# Patient Record
Sex: MALE | Race: WHITE
[De-identification: names, ages, dates, MRNs, and addresses within clinical notes are randomized per-mention and may not be internally consistent; named-entity substitution may affect disease eponyms.]

---

## 2018-02-12 ENCOUNTER — HOSPITAL ENCOUNTER (EMERGENCY)
Dept: HOSPITAL 33 - ED | Age: 38
Discharge: HOME | End: 2018-02-12
Payer: COMMERCIAL

## 2018-02-12 VITALS — SYSTOLIC BLOOD PRESSURE: 120 MMHG | DIASTOLIC BLOOD PRESSURE: 83 MMHG | OXYGEN SATURATION: 95 % | HEART RATE: 92 BPM

## 2018-02-12 DIAGNOSIS — R07.81: ICD-10-CM

## 2018-02-12 DIAGNOSIS — G40.909: ICD-10-CM

## 2018-02-12 DIAGNOSIS — Z72.0: ICD-10-CM

## 2018-02-12 DIAGNOSIS — I10: ICD-10-CM
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
## 2018-02-12 DIAGNOSIS — S52.201A: Primary | ICD-10-CM
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
## 2018-02-12 DIAGNOSIS — F41.9: ICD-10-CM

## 2018-02-12 DIAGNOSIS — M79.631: ICD-10-CM

## 2018-02-12 PROCEDURE — 99283 EMERGENCY DEPT VISIT LOW MDM: CPT

## 2018-02-12 PROCEDURE — 73090 X-RAY EXAM OF FOREARM: CPT

## 2018-02-12 PROCEDURE — 71100 X-RAY EXAM RIBS UNI 2 VIEWS: CPT

## 2018-02-12 PROCEDURE — 29126 APPL SHORT ARM SPLINT DYN: CPT

## 2018-02-12 PROCEDURE — 2W3CX1Z IMMOBILIZATION OF RIGHT LOWER ARM USING SPLINT: ICD-10-PCS

## 2018-02-12 RX ADMIN — IBUPROFEN ONE MG: 600 TABLET, FILM COATED ORAL at 20:17

## 2018-02-12 RX ADMIN — HYDROCODONE BITARTRATE AND ACETAMINOPHEN ONE TAB: 5; 325 TABLET ORAL at 22:19

## 2018-02-12 RX ADMIN — HYDROCODONE BITARTRATE AND ACETAMINOPHEN ONE TAB: 5; 325 TABLET ORAL at 22:20

## 2018-02-12 NOTE — ERPHSYRPT
- History of Present Illness


Time Seen by Provider: 02/12/18 20:01


Source: patient


Exam Limitations: no limitations


Physician History: 





ABOUT 14 HOURS AGO AT PT'S RESIDENCE PT SLIPPED ON HIS ICY DECK AND FELL WITH 

RESULTANT RIGHT RIB PAIN AND RIGHT FOREARM PAIN; DENIES LOC, NAUSEA, VOMITING, 

ABDOMINAL PAIN, TINGLING/NUMBNESS, WEAKNESS.


Allergies/Adverse Reactions: 








No Known Drug Allergies Allergy (Verified 02/12/18 20:13)


 





Home Medications: 








Amlodipine Besylate 10 mg [Norvasc 10 MG] 10 mg PO DAILY 02/19/16 [History]


Lamotrigine [Lamictal] 100 mg PO BID 02/19/16 [History]


Lisinopril 40 mg PO DAILY 02/19/16 [History]


Alprazolam 1 mg*** [Xanax 1 mg***] 1 mg PO BID 02/12/18 [History]





Hx Tetanus, Diphtheria Vaccination/Date Given: Yes (2014)


Hx Influenza Vaccination/Date Given: No


Hx Pneumococcal Vaccination/Date Given: No





- Review of Systems


Cardiac: Other (RIGHT RIB PAIN)


Musculoskeletal: Other (RIGHT FOREARM PAIN)


All Other Systems: Reviewed and Negative





- Past Medical History


Pertinent Past Medical History: Yes


Neurological History: Seizures


ENT History: No Pertinent History


Cardiac History: No Pertinent History, Hypertension


Respiratory History: No Pertinent History


Endocrine Medical History: No Pertinent History


Musculoskeletal History: Degenerative Disk Disease


GI Medical History: Hernia


 History: No Pertinent History


Psycho-Social History: Anxiety


Male Reproductive Disorders: No Pertinent History


Other Medical History: CHRONIC BACK PAIN





- Past Surgical History


Past Surgical History: Yes


Neuro Surgical History: No Pertinent History


Cardiac: No Pertinent History


Respiratory: No Pertinent History


Gastrointestinal: Hernia Repair


Genitourinary: No Pertinent History


Musculoskeletal: No Pertinent History


Male Surgical History: No Pertinent History





- Social History


Smoking Status: Current some day smoker


How long have you smoked: YRS


Exposure to second hand smoke: Yes


Alcohol Use: Socially


Drug Use: none


Patient Lives Alone: Yes


Significant Family History: no pertinent family hx





- Nursing Vital Signs


Nursing Vital Signs: 


 Initial Vital Signs











Temperature  97.8 F   02/12/18 20:01


 


Pulse Rate  104 H  02/12/18 20:01


 


Respiratory Rate  18   02/12/18 20:01


 


Blood Pressure  139/90   02/12/18 20:01


 


O2 Sat by Pulse Oximetry  96   02/12/18 20:01








 Pain Scale











Pain Intensity                 9

















- Melvin Coma Score


Best Eye Response (Melvin): (4) open spontaneously


Best Verbal Response (Melvin): (5) oriented


Best Motor Response ([place]): (6) obeys commands


Melvin Total: 15





- Physical Exam


General Appearance: alert


Head Injury: no evidence of injury


Eye Exam: PERRL/EOMI


ENT Exam: airway nml, nml ext.inspection


Neck Exam: trachea midline, No tenderness


Respiratory/Chest Exam: other (MILD RIGHT 9TH POSTERIOR RIB TENDERNESS WITHOUT 

CREPITUS OR BRUISING)


Cardiovascular Exam: normal heart sounds


Gastrointestinal Exam: soft, normal bowel sounds, No tenderness


Back Exam: normal range of motion, No vertebral tenderness


Extremity Exam: swelling (MILD TENDERNESS AND EDEMA OVER A ~ 3 CM DIAMETER 

ABRASION ON THE LATERAL ASPECT OF THE RIGHT FOREARM. FULL ROM OF ALL UPPER 

EXTREMITIES WITH GOOD CAPILLARY REFILL, SENSATION AND ROM OF ALL DIGITS OF BOTH 

HANDS.)


Neurologic Exam: alert, cooperative





Procedures





- Splinting


Location of Splint: Right, Forearm


Type of Splint: Orthoglass Short Arm Splint


Splint Applied By: ED Nurse


Pre-Proc Neuro Vasc Exam: normal


Post-Proc Neuro Vasc Exam: neurovascular intact, good alignment





- Course


Nursing assessment & vital signs reviewed: Yes





- Radiology Exams


  ** Right Ribs


X-ray Interpretation: Interpreted by me, No Fracture





  ** Right Forearm


X-ray Interpretation: Interpreted by me (MID-SHAFT ULNAR FRACTURE)


Ordered Tests: 


 Active Orders 24 hr











 Category Date Time Status


 


 Sling Application STAT Care  02/12/18 20:09 Active


 


 Splint STAT Care  02/12/18 21:44 Active


 


 FOREARM Stat Exams  02/12/18 20:10 Ordered


 


 RIBS UNILATERAL Stat Exams  02/12/18 20:10 Ordered








Medication Summary














Discontinued Medications














Generic Name Dose Route Start Last Admin





  Trade Name Freq  PRN Reason Stop Dose Admin


 


Hydrocodone Bitart/Acetaminophen  2 tab  02/12/18 21:43  





  Norco 5/325 Mg***  PO  02/12/18 21:44  





  STAT ONE   


 


Hydrocodone Bitart/Acetaminophen  2 tab  02/12/18 21:44  





  Norco 5/325 Mg***  PO  02/12/18 21:45  





  SENT HOME W/ PATIENT ONE   


 


Hydrocodone Bitart/Acetaminophen  Confirm  02/12/18 21:50  





  Norco 5/325 Mg***  Administered  02/12/18 21:51  





  Dose   





  2 tab   





  .ROUTE   





  .STK-MED ONE   


 


Hydrocodone Bitart/Acetaminophen  Confirm  02/12/18 21:50  





  Norco 5/325 Mg***  Administered  02/12/18 21:51  





  Dose   





  2 tab   





  .ROUTE   





  .STK-MED ONE   


 


Ibuprofen  600 mg  02/12/18 20:09  02/12/18 20:17





  Motrin 600 Mg***  PO  02/12/18 20:10  600 mg





  STAT ONE   Administration


 


Ibuprofen  Confirm  02/12/18 20:16  





  Motrin 600 Mg***  Administered  02/12/18 20:17  





  Dose   





  600 mg   





  .ROUTE   





  .STK-MED ONE   














- Progress


Discussed with : Eric ([address] - PL[address] RIGHT FOREARM IN SPLINT. PT TO GO TO 

OFFICE TOMORROW AT 1 PM.)





- Departure


Time of Disposition: 22:12


Departure Disposition: Home


Clinical Impression: 


 MID-SHAFT FRACTURE OF THE RIGHT ULNA, RIGHT MID BACK PAIN





Condition: Stable


Critical Care Time: No


Referrals: 


LOWELL BEEBE [Primary Care Provider] - 


Instructions:  Forearm Fracture (DC)


Additional Instructions: 


FOLLOW UP WITH DR KRAMER(ORTHOPEDIC SURGEON) TOMORROW AT 1 PM. CALL HIS OFFICE 

AT 9 AM TOMORROW TO ARRANGE THE 1 PM APPOINTMENT([phone]).


ELEVATE RIGHT ARM ABOVE HEART LEVEL AND KEEP SPLINT ON RIGHT FOREARM UNTIL DR KRAMER IS SEEN TOMORROW.


WEAR RIGHT ARM SLING FOR COMFORT.

## 2018-02-13 NOTE — XRAY
Indication: Pain following fall.



Comparison: July 22, 2014.



2 views of the right ribs demonstrates old 7/8/11 rib fractures.  No other

bony, articular, or soft tissue abnormalities.

## 2018-02-13 NOTE — XRAY
Indication: Pain following fall.



Comparison: None



2 views of the right forearm demonstrates mild displaced fracture involving

the mid to distal shaft of the ulna with adjacent soft tissue swelling.  No

other bony, articular, or soft tissue abnormalities.

## 2018-04-18 ENCOUNTER — HOSPITAL ENCOUNTER (OUTPATIENT)
Dept: HOSPITAL 33 - ED | Age: 38
Setting detail: OBSERVATION
LOS: 2 days | Discharge: HOME HEALTH | End: 2018-04-20
Attending: FAMILY MEDICINE | Admitting: FAMILY MEDICINE
Payer: COMMERCIAL
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
## 2018-04-18 DIAGNOSIS — F15.90: ICD-10-CM

## 2018-04-18 DIAGNOSIS — Z79.899: ICD-10-CM

## 2018-04-18 DIAGNOSIS — F41.8: ICD-10-CM

## 2018-04-18 DIAGNOSIS — S52.202S: ICD-10-CM

## 2018-04-18 DIAGNOSIS — M96.672: ICD-10-CM

## 2018-04-18 DIAGNOSIS — R56.9: Primary | ICD-10-CM

## 2018-04-18 LAB
AMPHETAMINES UR QL: POSITIVE
ANION GAP SERPL CALC-SCNC: 13.4 MEQ/L (ref 5–15)
BARBITURATES UR QL: NEGATIVE
BASOPHILS # BLD AUTO: 0.04 10*3/UL (ref 0–0.4)
BASOPHILS NFR BLD AUTO: 0.8 % (ref 0–0.4)
BENZODIAZ UR QL SCN: POSITIVE
BUN SERPL-MCNC: 12 MG/DL (ref 9–20)
CALCIUM SPEC-MCNC: 9.6 MG/DL (ref 8.4–10.2)
CHLORIDE SERPL-SCNC: 99 MMOL/L (ref 98–107)
CO2 SERPL-SCNC: 31 MMOL/L (ref 22–30)
COCAINE UR QL SCN: NEGATIVE
CREAT SERPL-MCNC: 0.86 MG/DL (ref 0.66–1.25)
EOSINOPHIL # BLD AUTO: 0.69 10*3/UL (ref 0–0.5)
GLUCOSE SERPL-MCNC: 91 MG/DL (ref 74–106)
GLUCOSE UR-MCNC: NEGATIVE MG/DL
GRANULOCYTES # BLD AUTO: 2.9 10*3/UL (ref 1.4–6.9)
HCT VFR BLD AUTO: 41.8 % (ref 42–50)
HGB BLD-MCNC: 13.6 GM/DL (ref 12.5–18)
LYMPHOCYTES # SPEC AUTO: 1.07 10*3/UL (ref 1–4.6)
MCH RBC QN AUTO: 29.5 PG (ref 26–32)
MCHC RBC AUTO-ENTMCNC: 32.5 G/DL (ref 32–36)
METHADONE UR QL: NEGATIVE
MONOCYTES # BLD AUTO: 0.49 10*3/UL (ref 0–1.3)
NEUTROPHILS NFR BLD AUTO: 55.9 % (ref 36–66)
OPIATES UR QL: NEGATIVE
PCP UR QL CFM>20 NG/ML: NEGATIVE
PLATELET # BLD AUTO: 285 K/MM3 (ref 150–450)
POTASSIUM SERPLBLD-SCNC: 4.4 MMOL/L (ref 3.5–5.1)
PROT UR STRIP-MCNC: NEGATIVE MG/DL
RBC # BLD AUTO: 4.61 M/MM3 (ref 4.1–5.6)
SODIUM SERPL-SCNC: 139 MMOL/L (ref 137–145)
THC UR QL SCN: POSITIVE
WBC # BLD AUTO: 5.2 K/MM3 (ref 4–10.5)

## 2018-04-18 PROCEDURE — 81002 URINALYSIS NONAUTO W/O SCOPE: CPT

## 2018-04-18 PROCEDURE — 95812 EEG 41-60 MINUTES: CPT

## 2018-04-18 PROCEDURE — 36415 COLL VENOUS BLD VENIPUNCTURE: CPT

## 2018-04-18 PROCEDURE — 93041 RHYTHM ECG TRACING: CPT

## 2018-04-18 PROCEDURE — 85025 COMPLETE CBC W/AUTO DIFF WBC: CPT

## 2018-04-18 PROCEDURE — 80202 ASSAY OF VANCOMYCIN: CPT

## 2018-04-18 PROCEDURE — G0378 HOSPITAL OBSERVATION PER HR: HCPCS

## 2018-04-18 PROCEDURE — 36000 PLACE NEEDLE IN VEIN: CPT

## 2018-04-18 PROCEDURE — 80048 BASIC METABOLIC PNL TOTAL CA: CPT

## 2018-04-18 PROCEDURE — 94760 N-INVAS EAR/PLS OXIMETRY 1: CPT

## 2018-04-18 PROCEDURE — 80175 DRUG SCREEN QUAN LAMOTRIGINE: CPT

## 2018-04-18 PROCEDURE — 73090 X-RAY EXAM OF FOREARM: CPT

## 2018-04-18 PROCEDURE — 93268 ECG RECORD/REVIEW: CPT

## 2018-04-18 PROCEDURE — 87040 BLOOD CULTURE FOR BACTERIA: CPT

## 2018-04-18 PROCEDURE — 80053 COMPREHEN METABOLIC PANEL: CPT

## 2018-04-18 PROCEDURE — 70450 CT HEAD/BRAIN W/O DYE: CPT

## 2018-04-18 PROCEDURE — 99285 EMERGENCY DEPT VISIT HI MDM: CPT

## 2018-04-18 PROCEDURE — 80307 DRUG TEST PRSMV CHEM ANLYZR: CPT

## 2018-04-18 NOTE — ERPHSYRPT
- History of Present Illness


Time Seen by Provider: 04/18/18 20:22


Source: patient, family


Exam Limitations: clinical condition


Patient Subjective Stated Complaint: has not been able to get a hold of Dr Reyes.  family states he has had seizures today X2.  arrived with splint long 

arm + radial pulse present.  family upset because they could not get a hold of 

Dr Reyes.


Triage Nursing Assessment: alert with muffled speech.  sig other states has had 

2 seizures tonight starting at 1500.  has hx of seizures.  has a fractured 

right arm that he has been dealing with an infection.  splint in place.  + 

pedal pulse on palpation.  good movement and sensation to the right hand,.  

wife states the hand was swollen yesterday but improved after antibiotics.  

they are here because they have not been able to get a hold of Dr Reyes and 

was told that he didnt need to be admitted from [facility] last night.  

wife states there was drainage from the site last night.


Physician History: 





PATIENT WITH A HISTORY OF SEIZURE DISORDER,  HAD PROLONGED SEIZURE TODAY.  

DENIES TRAUMA OR INJURY.  SUSTAINED FRACTURE TO RIGHT ULNA ON 2/12/2018, 

PLACEMENT OF HARDWARE, AND ON 4/10/2018 REQUIRED REMOVAL OR HARDWARE DUE TO 

INFECTION.  PATIENT COMPLAINS OF PERSISTENT PAIN.  DENIES FEVER OR CHILLS.


Timing/Duration: day(s)


Severity: moderate


Character of Deficits: impaired speech


Deficits: no difficulties


Baseline/Normal Cognition: alert oriented x 3


Current Cognition: poor alertness


Baseline Gait: walks w/o assistance


Associated Symptoms: slurred speech


Allergies/Adverse Reactions: 








No Known Drug Allergies Allergy (Verified 04/18/18 20:42)


 





Home Medications: 








Amlodipine Besylate 10 mg [Norvasc 10 MG] 10 mg PO DAILY 02/19/16 [History]


Lamotrigine [Lamictal] 100 mg PO BID 02/19/16 [History]


Lisinopril 40 mg PO DAILY 02/19/16 [History]


Alprazolam 1 mg*** [Xanax 1 mg***] 1 mg PO BID 02/12/18 [History]





Hx Tetanus, Diphtheria Vaccination/Date Given: Yes (2014)


Hx Influenza Vaccination/Date Given: No


Hx Pneumococcal Vaccination/Date Given: No


Immunizations Up to Date: Yes





- Review of Systems


Constitutional: No Fever, No Chills


Eyes: No Symptoms


Ears, Nose, & Throat: No Symptoms


Respiratory: No Symptoms, No Cough, No Dyspnea


Cardiac: No Symptoms, No Chest Pain, No Edema, No Syncope


Abdominal/Gastrointestinal: No Symptoms, No Abdominal Pain, No Nausea, No 

Vomiting, No Diarrhea


Genitourinary Symptoms: No Symptoms, No Dysuria


Musculoskeletal: No Back Pain, No Neck Pain


Skin: No Rash


Neurological: Seizure, No Dizziness, No Focal Weakness, No Sensory Changes


Psychological: No Symptoms


Endocrine: No Symptoms


All Other Systems: Reviewed and Negative





- Past Medical History


Pertinent Past Medical History: Yes


Neurological History: Seizures


ENT History: No Pertinent History


Cardiac History: No Pertinent History, Hypertension


Respiratory History: No Pertinent History


Endocrine Medical History: No Pertinent History


Musculoskeletal History: Degenerative Disk Disease


GI Medical History: Hernia


 History: No Pertinent History


Psycho-Social History: Anxiety


Male Reproductive Disorders: No Pertinent History


Other Medical History: CHRONIC BACK PAIN





- Past Surgical History


Past Surgical History: Yes


Neuro Surgical History: No Pertinent History


Cardiac: No Pertinent History


Respiratory: No Pertinent History


Gastrointestinal: Hernia Repair


Genitourinary: No Pertinent History


Musculoskeletal: No Pertinent History


Male Surgical History: No Pertinent History


Other Surgical History: surgical repain of fx to right arm





- Social History


Smoking Status: Never smoker


How long have you smoked: YRS


Exposure to second hand smoke: No


Alcohol Use: Socially


Drug Use: none


Patient Lives Alone: No


Significant Family History: no pertinent family hx





- Nursing Vital Signs


Nursing Vital Signs: 


 Initial Vital Signs











Temperature  97.5 F   04/18/18 20:10


 


Pulse Rate  85   04/18/18 20:10


 


Respiratory Rate  18   04/18/18 20:10


 


Blood Pressure  163/91   04/18/18 20:10


 


O2 Sat by Pulse Oximetry  98   04/18/18 20:10








 Pain Scale











Pain Intensity                 0

















- Melvin Coma Scale


Best Eye Response (Melvin): (4) open spontaneously


Best Verbal Response (Melvin): (5) oriented


Best Motor Response ([place]): (6) obeys commands


Melvin Total: 15





- Physical Exam


General Appearance: lethargy, other (GARBLED SPEECH, APPROPRIATE AND ORIENTED X 

3 )


Eye Exam: bilateral eye: normal inspection, PERRL, EOMI


Ears, Nose, Throat Exam: normal ENT inspection, moist mucous membranes


Neck Exam: normal inspection, non-tender, supple


Respiratory: normal breath sounds, lungs clear, airway intact, No respiratory 

distress


Gastrointestinal: soft, normal bowel sounds, No tenderness, No distention


Back Exam: normal inspection, normal range of motion


Extremity Exam: limited range of motion, swelling (HEALING INCISIONAL WOUND 

RIGHT DISTAL 3RD FOREARM, WITH SWELLING, ERYTHEMA AND TENDERNESS,  RIGHT RADIAL 

PULSE 2 +), tenderness


CNs Exam: normal hearing (GARBLED SPEECH)


Coordination/Gait: normal finger to nose, normal gait


Motor/Sensory: no motor deficit, no sensory deficit, no pronator drift


DTR: bicep (R): 2+, bicep (L): 2+, tricep (R): 2+, tricep (L): 2+, knee (R): 2+

, knee (L): 2+, ankle (R): 2+, ankle (L): 2+


Skin Exam: other (ERYTHEMA DISTAL RIGHT FOREARM)


**SpO2 Interpretation**: normal


SpO2: 98


Oxygen Delivery: Room Air





- CT Exams


  ** Head


CT Interpretation: Discussed w/radiologist (STABLE NEGATIVE HEAD CT)


Ordered Tests: 


 Active Orders 24 hr











 Category Date Time Status


 


 Cardiac Monitor STAT Care  04/18/18 21:58 Active


 


 IV Insertion STAT Care  04/18/18 20:42 Active


 


 FOREARM Stat Exams  04/18/18 20:56 Taken


 


 HEAD WITHOUT CONTRAST [CT] Stat Exams  04/18/18 20:44 Taken


 


 BLOOD CULTURE Stat Lab  04/18/18 21:00 Received


 


 BMP Stat Lab  04/18/18 20:50 Completed


 


 CBC W DIFF Stat Lab  04/18/18 20:50 Completed


 


 UA W/RFX UR CULTURE Stat Lab  04/18/18 20:44 Ordered


 


 Urine Triage Profile Stat Lab  04/18/18 20:44 Ordered


 


 Transfer Order Routine Transfer  04/18/18 Ordered








Medication Summary











Generic Name Dose Route Start Last Admin





  Trade Name Freq  PRN Reason Stop Dose Admin


 


Vancomycin HCl  1 gm in 250 mls @ 167 mls/hr  04/18/18 21:44  04/18/18 22:00





  Vancomycin 1gm/ Ns 250ml***  IV  04/18/18 23:13  167 mls/hr





  STAT ONE   Administration


 


Sodium Chloride  1,000 mls @ 999 mls/hr  04/18/18 21:57  04/18/18 22:02





  Sodium Chloride 0.9% 1000 Ml  IV  04/18/18 22:57  999 mls/hr





  .Q1H1M STA   Administration














Discontinued Medications














Generic Name Dose Route Start Last Admin





  Trade Name Freq  PRN Reason Stop Dose Admin


 


Sodium Chloride  1,000 mls @ 999 mls/hr  04/18/18 20:42  04/18/18 20:48





  Sodium Chloride 0.9% 1000 Ml  IV  04/18/18 21:42  999 mls/hr





  .Q1H1M STA   Administration


 


Sodium Chloride  Confirm  04/18/18 20:46  





  Sodium Chloride 0.9% 1000 Ml  Administered  04/18/18 20:47  





  Dose   





  1,000 mls @ ud   





  .ROUTE   





  .STK-MED ONE   


 


Levetiracetam 500 mg/ Dextrose  105 mls @ 400 mls/hr  04/18/18 20:46  04/18/18 

21:02





  IV  04/18/18 21:01  400 mls/hr





  STAT ONE   Administration


 


Dextrose  Confirm  04/18/18 20:59  





  D5w 100ml Mini Bag 100 Ml  Administered  04/18/18 21:00  





  Dose   





  100 mls @ ud   





  IV   





  .STK-MED ONE   


 


Vancomycin HCl  Confirm  04/18/18 21:56  





  Vancomycin 1gm/ Ns 250ml***  Administered  04/18/18 21:57  





  Dose   





  250 mls @ ud   





  IV   





  .STK-MED ONE   


 


Sodium Chloride  Confirm  04/18/18 22:01  





  Sodium Chloride 0.9% 1000 Ml  Administered  04/18/18 22:02  





  Dose   





  1,000 mls @ ud   





  .ROUTE   





  .STK-MED ONE   


 


Levetiracetam  Confirm  04/18/18 20:58  





  Keppra 500 Mg/5 Ml***  Administered  04/18/18 20:59  





  Dose   





  500 mg   





  .ROUTE   





  .STK-MED ONE   











Lab/Rad Data: 


 Laboratory Result Diagrams





 04/18/18 20:50 





 04/18/18 20:50 





 Laboratory Results











  04/18/18 04/18/18 Range/Units





  20:50 20:50 


 


WBC   5.2  (4.0-10.5)  K/mm3


 


RBC   4.61  (4.1-5.6)  M/mm3


 


Hgb   13.6  (12.5-18.0)  gm/dl


 


Hct   41.8 L  (42-50)  %


 


MCV   90.7  ()  fl


 


MCH   29.5  (26-32)  pg


 


MCHC   32.5  (32-36)  g/dl


 


RDW   13.5  (11.5-14.0)  %


 


Plt Count   285  (150-450)  K/mm3


 


MPV   10.7 H  (6-9.5)  fl


 


Gran %   55.9  (36.0-66.0)  %


 


Eos # (Auto)   0.69 H  (0-0.5)  


 


Absolute Lymphs (auto)   1.07  (1.0-4.6)  


 


Absolute Monos (auto)   0.49  (0.0-1.3)  


 


Lymphocytes %   20.6 L  (24.0-44.0)  %


 


Monocytes %   9.4  (0.0-12.0)  %


 


Eosinophils %   13.3 H  (0.00-5.0)  %


 


Basophils %   0.8  (0.0-0.4)  %


 


Absolute Granulocytes   2.90  (1.4-6.9)  


 


Basophils #   0.04  (0-0.4)  


 


Sodium  139   (137-145)  mmol/L


 


Potassium  4.4   (3.5-5.1)  mmol/L


 


Chloride  99   ()  mmol/L


 


Carbon Dioxide  31 H   (22-30)  mmol/L


 


Anion Gap  13.4   (5-15)  MEQ/L


 


BUN  12   (9-20)  mg/dL


 


Creatinine  0.86   (0.66-1.25)  mg/dL


 


Estimated GFR  > 60.0   ML/MIN


 


Glucose  91   ()  mg/dL


 


Calcium  9.6   (8.4-10.2)  mg/dL














- Progress


Progress Note: 





04/18/18 21:43


IV BOLUS NORMAL SALINE 1 LITER/HR X 2 


Discussed with DrKhadijah: Leonard (DISCUSSED WITH DR KAISER AT 2200 FOR ADMISSION)





- Departure


Time of Disposition: 22:12


Departure Disposition: Observation


Clinical Impression: 


 SEIZURE DISORDER, CELLULITIS RIGHT FOREARM





Condition: Stable


Critical Care Time: No


Referrals: 


LOWELL BEEBE [Primary Care Provider] -

## 2018-04-19 RX ADMIN — HYDROCHLOROTHIAZIDE SCH MG: 25 TABLET ORAL at 10:13

## 2018-04-19 RX ADMIN — HYDROCODONE BITARTRATE AND ACETAMINOPHEN PRN TAB: 5; 325 TABLET ORAL at 10:20

## 2018-04-19 RX ADMIN — SODIUM CHLORIDE SCH MLS/HR: 9 INJECTION, SOLUTION INTRAVENOUS at 23:28

## 2018-04-19 RX ADMIN — LAMOTRIGINE SCH MG: 100 TABLET ORAL at 10:13

## 2018-04-19 RX ADMIN — SODIUM CHLORIDE SCH MLS/HR: 9 INJECTION, SOLUTION INTRAVENOUS at 14:34

## 2018-04-19 RX ADMIN — FLUTICASONE PROPIONATE SCH GM: 50 SPRAY, METERED NASAL at 10:12

## 2018-04-19 RX ADMIN — TRAMADOL HYDROCHLORIDE SCH MG: 50 TABLET, FILM COATED ORAL at 10:14

## 2018-04-19 RX ADMIN — SODIUM CHLORIDE SCH MLS/HR: 9 INJECTION, SOLUTION INTRAVENOUS at 07:59

## 2018-04-19 RX ADMIN — HYDROCODONE BITARTRATE AND ACETAMINOPHEN PRN TAB: 5; 325 TABLET ORAL at 21:28

## 2018-04-19 RX ADMIN — ALPRAZOLAM SCH MG: 1 TABLET ORAL at 21:21

## 2018-04-19 RX ADMIN — LEVETIRACETAM SCH MG: 500 TABLET, FILM COATED ORAL at 21:20

## 2018-04-19 RX ADMIN — PAROXETINE HYDROCHLORIDE SCH MG: 20 TABLET, FILM COATED ORAL at 10:14

## 2018-04-19 RX ADMIN — TRAMADOL HYDROCHLORIDE SCH MG: 50 TABLET, FILM COATED ORAL at 21:20

## 2018-04-19 RX ADMIN — HYDROCODONE BITARTRATE AND ACETAMINOPHEN PRN TAB: 5; 325 TABLET ORAL at 16:59

## 2018-04-19 RX ADMIN — LAMOTRIGINE SCH MG: 100 TABLET ORAL at 21:20

## 2018-04-19 RX ADMIN — AMLODIPINE BESYLATE SCH MG: 5 TABLET ORAL at 10:13

## 2018-04-19 RX ADMIN — LEVETIRACETAM SCH MG: 500 TABLET, FILM COATED ORAL at 10:12

## 2018-04-19 RX ADMIN — SODIUM CHLORIDE SCH MLS/HR: 9 INJECTION, SOLUTION INTRAVENOUS at 20:07

## 2018-04-19 RX ADMIN — ALPRAZOLAM SCH: 1 TABLET ORAL at 21:21

## 2018-04-19 NOTE — XRAY
Indication: Seizure.



Multiple contiguous axial images obtained through the head without contrast.



Comparison: October 16, 2016.



Again normal appearing brain parenchyma, ventricles, and bony calvarium.

Minimal mucosal thickening of both ethmoid sinuses.  Mastoid air cells are

clear.



Impression: Minimal paranasal sinus disease.  Remaining CT head without

contrast exam remains normal.



CTDI 68.51

## 2018-04-19 NOTE — XRAY
Indication: Infected forearm.



Comparison: April 10, 201.



2 views of the right forearm using portable technique again demonstrates mild

healing distal ulnar shaft fracture with minimally increased

angulation/displacement possibly explained by difference in positioning though

refracture not completely excluded in the right clinical setting.  Stable soft

tissue swelling and bony radiolucencies from previous hardware.  No other

bony, articular, or soft tissue abnormalities.

## 2018-04-20 VITALS — OXYGEN SATURATION: 99 % | DIASTOLIC BLOOD PRESSURE: 80 MMHG | SYSTOLIC BLOOD PRESSURE: 142 MMHG | HEART RATE: 86 BPM

## 2018-04-20 LAB
ALBUMIN SERPL-MCNC: 3.6 G/DL (ref 3.5–5)
ALP SERPL-CCNC: 77 U/L (ref 38–126)
ALT SERPL-CCNC: 24 U/L (ref 0–50)
ANION GAP SERPL CALC-SCNC: 12.4 MEQ/L (ref 5–15)
AST SERPL QL: 22 U/L (ref 17–59)
BASOPHILS # BLD AUTO: 0.03 10*3/UL (ref 0–0.4)
BASOPHILS NFR BLD AUTO: 0.6 % (ref 0–0.4)
BILIRUB BLD-MCNC: < 0.1 MG/DL (ref 0.2–1.3)
BUN SERPL-MCNC: 7 MG/DL (ref 9–20)
CALCIUM SPEC-MCNC: 9 MG/DL (ref 8.4–10.2)
CHLORIDE SERPL-SCNC: 101 MMOL/L (ref 98–107)
CO2 SERPL-SCNC: 27 MMOL/L (ref 22–30)
CREAT SERPL-MCNC: 0.71 MG/DL (ref 0.66–1.25)
EOSINOPHIL # BLD AUTO: 0.39 10*3/UL (ref 0–0.5)
GLUCOSE SERPL-MCNC: 107 MG/DL (ref 74–106)
GRANULOCYTES # BLD AUTO: 3.61 10*3/UL (ref 1.4–6.9)
HCT VFR BLD AUTO: 38.4 % (ref 42–50)
HGB BLD-MCNC: 12.5 GM/DL (ref 12.5–18)
LYMPHOCYTES # SPEC AUTO: 0.65 10*3/UL (ref 1–4.6)
MCH RBC QN AUTO: 29.3 PG (ref 26–32)
MCHC RBC AUTO-ENTMCNC: 32.6 G/DL (ref 32–36)
MONOCYTES # BLD AUTO: 0.48 10*3/UL (ref 0–1.3)
NEUTROPHILS NFR BLD AUTO: 69.9 % (ref 36–66)
PLATELET # BLD AUTO: 256 K/MM3 (ref 150–450)
POTASSIUM SERPLBLD-SCNC: 3.8 MMOL/L (ref 3.5–5.1)
PROT SERPL-MCNC: 6.4 G/DL (ref 6.3–8.2)
RBC # BLD AUTO: 4.27 M/MM3 (ref 4.1–5.6)
SODIUM SERPL-SCNC: 136 MMOL/L (ref 137–145)
WBC # BLD AUTO: 5.2 K/MM3 (ref 4–10.5)

## 2018-04-20 RX ADMIN — HYDROCHLOROTHIAZIDE SCH MG: 25 TABLET ORAL at 08:59

## 2018-04-20 RX ADMIN — SODIUM CHLORIDE SCH MLS/HR: 9 INJECTION, SOLUTION INTRAVENOUS at 12:11

## 2018-04-20 RX ADMIN — FLUTICASONE PROPIONATE SCH GM: 50 SPRAY, METERED NASAL at 08:59

## 2018-04-20 RX ADMIN — AMLODIPINE BESYLATE SCH MG: 5 TABLET ORAL at 08:58

## 2018-04-20 RX ADMIN — HYDROCODONE BITARTRATE AND ACETAMINOPHEN PRN TAB: 5; 325 TABLET ORAL at 15:59

## 2018-04-20 RX ADMIN — PAROXETINE HYDROCHLORIDE SCH MG: 20 TABLET, FILM COATED ORAL at 08:58

## 2018-04-20 RX ADMIN — ALPRAZOLAM SCH MG: 1 TABLET ORAL at 08:59

## 2018-04-20 RX ADMIN — LAMOTRIGINE SCH MG: 100 TABLET ORAL at 08:58

## 2018-04-20 RX ADMIN — TRAMADOL HYDROCHLORIDE SCH MG: 50 TABLET, FILM COATED ORAL at 08:58

## 2018-04-20 RX ADMIN — HYDROCODONE BITARTRATE AND ACETAMINOPHEN PRN TAB: 5; 325 TABLET ORAL at 10:27

## 2018-04-20 RX ADMIN — HYDROCODONE BITARTRATE AND ACETAMINOPHEN PRN TAB: 5; 325 TABLET ORAL at 05:45

## 2018-04-20 RX ADMIN — SODIUM CHLORIDE SCH MLS/HR: 9 INJECTION, SOLUTION INTRAVENOUS at 07:17

## 2018-04-20 RX ADMIN — LEVETIRACETAM SCH MG: 500 TABLET, FILM COATED ORAL at 08:58

## 2018-04-20 NOTE — HP
CHIEF COMPLAINT:

1) Seizure. 

2) Fracture left ulna. 

3) Infection in the wound site. 



HISTORY OF PRESENT ILLNESS: The patient is a 37 year-old white male patient who has had 
bouts with fracture of his left ulna. Apparently he had a plate in. They removed the 
screws and apparently he has gotten an infection at this time. He had been placed on 
amoxicillin. He apparently went to the emergency room at [facility] for complaints of 
wound infection. Apparently they felt that Dr. Reyes wished him to be admitted to [facility]. He was sent to the emergency room there and evaluated but then 
sent away. He presented himself to our emergency room after being upset with the events. 
He apparently had two seizures which in my mind could well have been pseudoseizures. 
However he was placed in observation in our facility due to the seizures and for continued 
treatment of his reported infection at the wound site from his fracture. 



PAST MEDICAL/SURGICAL HISTORY: Significant for anxiety and depression issues. 



HOME MEDICATIONS: Includes amlodipine 10 mg, Lamictal 100 mg b.i.d., lisinopril 40 mg a 
day, Alprazolam 1 mg b.i.d. 



ALLERGIES: NKDA.  



PHYSICAL EXAMINATION:  Revealed a well nourished, well developed 37 year-old white male 
patient in no obvious distress. His vital signs recently have shown temperature 97.3F, 
pulse 94, respiratory rate 18, blood pressure 118/59. O2 saturation 97% on room air. 

HEENT:  Normocephalic, atraumatic. Pupils equal round reactive to light. Extraocular 
movements intact. Oropharynx is pink and moist.

NECK:  Supple without lymphadenopathy, thyromegaly or JVD. 

CHEST:  Clear to auscultation.

HEART: Regular rate and rhythm without murmurs, rubs or gallops.

ABDOMEN: Soft, nontender, nondistended without hepatosplenomegaly or masses.

EXTREMITIES: Revealed the right arm to be in a sling. After unwrapping it the wound site 
appears to be fairly clean with Prolene sutures apparently still in the wound site but the 
wound site appears to be fairly clear.   

NEUROLOGIC: Appears to be intact. He has had no further episodes of seizures since his 
admission.  



LAB DATA AND TESTS: The patient's x-rays showed stable tissue swelling, bony radial 
lucency from previous hardware. There appears to be mild healing of distal ulnar shaft 
fracture with minimally increased angulation and displacement possibly explained by 
difference in position. No knot completely excluded for refracture. He had CT scan of the 
head which was essentially normal. His laboratory studies showed his hemoglobin 12.5, 
white blood cell count 5,200, PLT count 256,000. Glucose 107, BUN 7, creatinine 0.71. 
Electrolytes were normal. Liver enzymes were normal. His urine drug screen however did 
come back positive for amphetamines for which he had no explanation, benzodiazepine was 
positive, THC was also positive. 

 

ASSESSMENT: The patient has been admitted to the hospital. He is on IV Vancomycin. We 
received an EEG which has thus far been negative for evidence of seizure focus. We are 
awaiting Dr. Emmanuel Reyes to come evaluate his wound and instruct us on further treatment. 
At the present time he is on Vancomycin pending culture as well as from the previous 
hospitalizations at [facility].

## 2018-06-14 ENCOUNTER — HOSPITAL ENCOUNTER (EMERGENCY)
Dept: HOSPITAL 33 - ED | Age: 38
Discharge: ANOTHER HOSPITAL | End: 2018-06-14
Payer: COMMERCIAL

## 2018-06-14 VITALS — HEART RATE: 85 BPM | DIASTOLIC BLOOD PRESSURE: 46 MMHG | SYSTOLIC BLOOD PRESSURE: 95 MMHG

## 2018-06-14 VITALS — OXYGEN SATURATION: 97 %

## 2018-06-14 DIAGNOSIS — I95.9: ICD-10-CM

## 2018-06-14 DIAGNOSIS — L02.413: Primary | ICD-10-CM

## 2018-06-14 DIAGNOSIS — R42: ICD-10-CM

## 2018-06-14 DIAGNOSIS — R11.0: ICD-10-CM

## 2018-06-14 DIAGNOSIS — Z79.899: ICD-10-CM

## 2018-06-14 LAB
ALBUMIN SERPL-MCNC: 4.5 G/DL (ref 3.5–5)
ALP SERPL-CCNC: 117 U/L (ref 38–126)
ALT SERPL-CCNC: 27 U/L (ref 0–50)
ANION GAP SERPL CALC-SCNC: 24.6 MEQ/L (ref 5–15)
AST SERPL QL: 29 U/L (ref 17–59)
BILIRUB BLD-MCNC: 0.9 MG/DL (ref 0.2–1.3)
BUN SERPL-MCNC: 58 MG/DL (ref 9–20)
CALCIUM SPEC-MCNC: 9.7 MG/DL (ref 8.4–10.2)
CHLORIDE SERPL-SCNC: 98 MMOL/L (ref 98–107)
CO2 SERPL-SCNC: 20 MMOL/L (ref 22–30)
CREAT SERPL-MCNC: 5.61 MG/DL (ref 0.66–1.25)
GLUCOSE SERPL-MCNC: 135 MG/DL (ref 74–106)
GLUCOSE UR-MCNC: NEGATIVE MG/DL
GRANULOCYTES # BLD AUTO: 10.88 10*3/UL (ref 1.4–6.9)
HCT VFR BLD AUTO: 40.5 % (ref 42–50)
HGB BLD-MCNC: 13.9 GM/DL (ref 12.5–18)
MANUAL DIF COMMENT BLD-IMP: NORMAL
MCH RBC QN AUTO: 29.1 PG (ref 26–32)
MCHC RBC AUTO-ENTMCNC: 34.3 G/DL (ref 32–36)
PLATELET # BLD AUTO: 251 K/MM3 (ref 150–450)
POTASSIUM SERPLBLD-SCNC: 4.7 MMOL/L (ref 3.5–5.1)
PROT SERPL-MCNC: 7.5 G/DL (ref 6.3–8.2)
PROT UR STRIP-MCNC: (no result) MG/DL
RBC # BLD AUTO: 4.77 M/MM3 (ref 4.1–5.6)
RBC # URNS HPF: (no result) /HPF (ref 0–2)
SODIUM SERPL-SCNC: 138 MMOL/L (ref 137–145)
WBC # BLD AUTO: 13.9 K/MM3 (ref 4–10.5)
WBC URNS QL MICRO: (no result) /HPF (ref 0–5)

## 2018-06-14 PROCEDURE — 71045 X-RAY EXAM CHEST 1 VIEW: CPT

## 2018-06-14 PROCEDURE — 87040 BLOOD CULTURE FOR BACTERIA: CPT

## 2018-06-14 PROCEDURE — 96360 HYDRATION IV INFUSION INIT: CPT

## 2018-06-14 PROCEDURE — 73200 CT UPPER EXTREMITY W/O DYE: CPT

## 2018-06-14 PROCEDURE — 93005 ELECTROCARDIOGRAM TRACING: CPT

## 2018-06-14 PROCEDURE — 96374 THER/PROPH/DIAG INJ IV PUSH: CPT

## 2018-06-14 PROCEDURE — 51702 INSERT TEMP BLADDER CATH: CPT

## 2018-06-14 PROCEDURE — 36415 COLL VENOUS BLD VENIPUNCTURE: CPT

## 2018-06-14 PROCEDURE — 36000 PLACE NEEDLE IN VEIN: CPT

## 2018-06-14 PROCEDURE — 84484 ASSAY OF TROPONIN QUANT: CPT

## 2018-06-14 PROCEDURE — 87186 SC STD MICRODIL/AGAR DIL: CPT

## 2018-06-14 PROCEDURE — 87077 CULTURE AEROBIC IDENTIFY: CPT

## 2018-06-14 PROCEDURE — 83605 ASSAY OF LACTIC ACID: CPT

## 2018-06-14 PROCEDURE — 96375 TX/PRO/DX INJ NEW DRUG ADDON: CPT

## 2018-06-14 PROCEDURE — 87070 CULTURE OTHR SPECIMN AEROBIC: CPT

## 2018-06-14 PROCEDURE — 85025 COMPLETE CBC W/AUTO DIFF WBC: CPT

## 2018-06-14 PROCEDURE — 80053 COMPREHEN METABOLIC PANEL: CPT

## 2018-06-14 PROCEDURE — 81000 URINALYSIS NONAUTO W/SCOPE: CPT

## 2018-06-14 PROCEDURE — 82550 ASSAY OF CK (CPK): CPT

## 2018-06-14 PROCEDURE — 99285 EMERGENCY DEPT VISIT HI MDM: CPT

## 2018-06-14 PROCEDURE — 87086 URINE CULTURE/COLONY COUNT: CPT

## 2018-06-14 NOTE — XRAY
Indication: Possible sepsis.



Multiple contiguous axial images obtained through the right forearm.

2-dimensional sagittal and coronal reformatted images obtained.



Comparison: None.  There is a recent right forearm radiograph June 7, 2018.



Distal ulnar shaft demonstrates nondisplaced comminuted partial healing

fracture with bridging callus formation present.  Multiple transverse

radiolucencies proximal and distal to the fracture are associated with old

fixation hardware.  There is adjacent cutaneous and subcutaneous soft tissue

swelling/edema with small open wound just medial to the fracture.  Also tiny

subcutaneous air bubble for which gas-forming infection not completely

excluded.  No other bony, articular, or soft tissue abnormalities.



Impression: Distal ulnar shaft partial healing fracture with adjacent soft

tissue swelling.  Appearance is grossly unchanged with respect to recent

radiograph.  Soft tissue swelling also demonstrates tiny subcutaneous air

bubble for which gas-forming infection not completely excluded in the right

clinical setting.



CT DI 37.17

## 2018-06-14 NOTE — XRAY
Indication: Patient unresponsive.  Possible sepsis.



Comparison: None



Portable chest demonstrates normal heart, lungs, and bony thorax.

## 2018-06-14 NOTE — ERPHSYRPT
- History of Present Illness


Time Seen by Provider: 06/14/18 12:32


Source: patient


Exam Limitations: no limitations


Patient Subjective Stated Complaint: Sent to ED from wound care due to 

hypotension


Triage Nursing Assessment: Pt presents to the ED from Wound care, post wound 

care treatment, due to hypotension. Pt states he is tired, but denies other 

complaints. Pt is being seen by wound care for wound to right forearm. Pt 

states he finished his wound care visit prior to being sent to ED. No distress 

noted, skin PWD.


Physician History: 





37 y/o male comes sent to the ER for hypotension from the wound clinic. In 

February, patient had a fracture of the right forearm, had a plate placed, 

which was removed because of sepsis. The mother says that the area on the right 

forearm is more swollen, red and warm to touch. The area was packed today as 

well. Pt arrives with a SBP in the 70's. Pt admits to having dizziness and 

nausea. 


Timing/Duration: today


Severity: moderate


Modifying Factors: Improves With: movement


Associated Symptoms: nausea, weakness


Allergies/Adverse Reactions: 








No Known Drug Allergies Allergy (Verified 04/18/18 20:42)


 





Home Medications: 








Amlodipine Besylate 10 mg [Norvasc 10 MG] 10 mg PO DAILY 02/19/16 [History]


Lamotrigine [Lamictal] 100 mg PO BID 02/19/16 [History]


Lisinopril 40 mg PO DAILY 02/19/16 [History]


Fluticasone Propionate*** [Flonase NASAL***] 16 gm NS DAILY 04/18/18 [History]


Paroxetine HCl 1 tablet PO DAILY 04/18/18 [History]


Testosterone Cypionate 200 mg IM UD 04/18/18 [History]





Hx Tetanus, Diphtheria Vaccination/Date Given: Yes


Hx Influenza Vaccination/Date Given: Yes


Hx Pneumococcal Vaccination/Date Given: Yes


Immunizations Up to Date: Yes





- Review of Systems


Constitutional: No Fever, No Chills


Eyes: No Symptoms


Ears, Nose, & Throat: No Symptoms


Respiratory: No Cough, No Dyspnea


Cardiac: No Chest Pain, No Edema, No Syncope


Abdominal/Gastrointestinal: No Abdominal Pain, No Nausea, No Vomiting, No 

Diarrhea


Genitourinary Symptoms: No Dysuria


Musculoskeletal: Myalgias, No Back Pain, No Neck Pain


Skin: No Rash


Neurological: No Dizziness, No Focal Weakness, No Sensory Changes


Psychological: No Symptoms


Endocrine: No Symptoms


All Other Systems: Reviewed and Negative





- Past Medical History


Pertinent Past Medical History: Yes


Neurological History: Seizures


ENT History: No Pertinent History


Cardiac History: No Pertinent History, Hypertension


Respiratory History: No Pertinent History


Endocrine Medical History: No Pertinent History


Musculoskeletal History: Degenerative Disk Disease, Fractures


GI Medical History: Hernia


 History: No Pertinent History


Psycho-Social History: Anxiety


Male Reproductive Disorders: No Pertinent History


Other Medical History: CHRONIC BACK PAIN





- Past Surgical History


Past Surgical History: Yes


Neuro Surgical History: No Pertinent History


Cardiac: No Pertinent History


Respiratory: No Pertinent History


Gastrointestinal: Hernia Repair


Genitourinary: No Pertinent History


Musculoskeletal: No Pertinent History


Male Surgical History: No Pertinent History


Other Surgical History: surgical repain of fx to right arm.  hardware removal 

right arm.  and I&D with antibiotic bead placement





- Social History


Smoking Status: Current every day smoker


How long have you smoked: 2 years


Exposure to second hand smoke: Yes


Alcohol Use: Socially


Drug Use: none


Patient Lives Alone: No


Significant Family History: no pertinent family hx





- Nursing Vital Signs


Nursing Vital Signs: 


 Initial Vital Signs











Temperature  97.4 F   06/14/18 12:19


 


Pulse Rate  95 H  06/14/18 12:19


 


Respiratory Rate  16   06/14/18 12:19


 


Blood Pressure  92/57   06/14/18 12:19


 


O2 Sat by Pulse Oximetry  97   06/14/18 12:19








 Pain Scale











Pain Intensity                 0

















- Physical Exam


General Appearance: no apparent distress, alert


Eye Exam: PERRL/EOMI, eyes nml inspection


Ears, Nose, Throat Exam: normal ENT inspection, TMs normal, pharynx normal, 

moist mucous membranes


Neck Exam: normal inspection, non-tender, supple, full range of motion


Respiratory Exam: normal breath sounds, lungs clear, No respiratory distress


Cardiovascular Exam: regular rate/rhythm, normal heart sounds, normal 

peripheral pulses


Gastrointestinal/Abdomen Exam: soft, normal bowel sounds, No tenderness, No mass


Back Exam: normal inspection, normal range of motion, No CVA tenderness, No 

vertebral tenderness


Extremity Exam: normal inspection, normal range of motion, pelvis stable, 

limited range of motion, swelling, tenderness


Neurologic Exam: alert, oriented x 3, cooperative, normal mood/affect, nml 

cerebellar function, nml station & gait, sensation nml, No motor deficits


Skin Exam: normal color, warm, dry, No rash


Lymphatic Exam: No adenopathy


SpO2: 97


Oxygen Delivery: Room Air





- Course


Nursing assessment & vital signs reviewed: Yes


EKG Interpreted by Me: RATE, NORMAL AXIS, NORMAL INTERVALS, NORMAL QRS, NORMAL 

ST-T


Ordered Tests: 


 Active Orders 24 hr











 Category Date Time Status


 


 EKG-ER Only STAT Care  06/14/18 12:39 Active


 


 IV Insertion STAT Care  06/14/18 12:36 Active


 


 CHEST 1 VIEW (PORTABLE) Stat Exams  06/14/18 12:36 Completed


 


 UPPER EXTREMITY W/O CONTRAST [CT] Stat Exams  06/14/18 12:37 Completed


 


 BLOOD CULTURE Stat Lab  06/14/18 12:54 Received


 


 CBC W DIFF Stat Lab  06/14/18 12:54 Completed


 


 CK-Creatinine Phosphokinase Stat Lab  06/14/18 12:54 Completed


 


 CMP Stat Lab  06/14/18 12:54 Completed


 


 CULTURE,URINE Stat Lab  06/14/18 15:10 Ordered


 


 CULTURE,WOUND Routine Lab  06/14/18 13:53 Received


 


 Lactic Acid Stat Lab  06/14/18 12:36 Completed


 


 Manual Differential NC Stat Lab  06/14/18 12:54 Completed


 


 TROPONIN Q3H Lab  06/14/18 12:54 Completed


 


 TROPONIN Q3H Lab  06/14/18 16:45 Ordered


 


 TROPONIN Q3H Lab  06/14/18 19:45 Ordered


 


 TROPONIN Q3H Lab  06/14/18 22:45 Ordered


 


 TROPONIN Q3H Lab  06/15/18 01:45 Ordered


 


 UA Stat Lab  06/14/18 15:10 Ordered








Medication Summary











Generic Name Dose Route Start Last Admin





  Trade Name Freq  PRN Reason Stop Dose Admin


 


Sodium Chloride  1,000 mls @ 999 mls/hr  06/14/18 14:43  06/14/18 15:09





  Sodium Chloride 0.9% 1000 Ml  IV  06/14/18 15:43  999 mls/hr





  .Q1H1M STA   Administration


 


Piperacillin Sod/Tazobactam Sod  3.375 gm in 100 mls @ 200 mls/hr  06/14/18 14:

57  06/14/18 15:09





  Zosyn 3.375gm/100 Ml D5w  IV  06/14/18 15:26  100 ml/hr





  STAT STA   100 mls/hr





     Administration














Discontinued Medications














Generic Name Dose Route Start Last Admin





  Trade Name Freq  PRN Reason Stop Dose Admin


 


Sodium Chloride  1,000 mls @ 999 mls/hr  06/14/18 12:36  06/14/18 12:59





  Sodium Chloride 0.9% 1000 Ml  IV  06/14/18 13:36  999 mls/hr





  .Q1H1M STA   Administration


 


Vancomycin HCl  250 mls @ 167 mls/hr  06/14/18 12:36  06/14/18 13:00





  Vancomycin 1gm/ Ns 250ml***  IV  06/14/18 14:05  167 mls/hr





  STAT ONE   Administration


 


Sodium Chloride  Confirm  06/14/18 12:56  





  Sodium Chloride 0.9% 1000 Ml  Administered  06/14/18 12:57  





  Dose   





  1,000 mls @ ud   





  .ROUTE   





  .STK-MED ONE   


 


Vancomycin HCl  Confirm  06/14/18 12:56  





  Vancomycin 1gm/ Ns 250ml***  Administered  06/14/18 12:57  





  Dose   





  250 mls @ ud   





  IV   





  .STK-MED ONE   


 


Sodium Chloride  1,000 mls @ 999 mls/hr  06/14/18 13:44  06/14/18 13:59





  Sodium Chloride 0.9% 1000 Ml  IV  06/14/18 14:44  999 mls/hr





  .Q1H1M STA   Administration


 


Sodium Chloride  Confirm  06/14/18 13:58  





  Sodium Chloride 0.9% 1000 Ml  Administered  06/14/18 13:59  





  Dose   





  1,000 mls @ ud   





  .ROUTE   





  .STK-MED ONE   


 


Sodium Chloride  Confirm  06/14/18 15:00  





  Sodium Chloride 0.9% 1000 Ml  Administered  06/14/18 15:01  





  Dose   





  1,000 mls @ ud   





  .ROUTE   





  .STK-MED ONE   


 


Piperacillin Sod/Tazobactam Sod  Confirm  06/14/18 15:00  





  Zosyn 3.375gm/100 Ml D5w  Administered  06/14/18 15:01  





  Dose   





  3.375 gm in 100 mls @ ud   





  IV   





  .STK-MED ONE   


 


Morphine Sulfate  4 mg  06/14/18 12:38  06/14/18 13:01





  Morphine Sulfate 4 Mg Inj***  IV  06/14/18 12:39  4 mg





  STAT ONE   Administration


 


Morphine Sulfate  Confirm  06/14/18 12:56  





  Morphine Sulfate 4 Mg Inj***  Administered  06/14/18 12:57  





  Dose   





  4 mg   





  .ROUTE   





  .STK-MED ONE   


 


Ondansetron HCl  4 mg  06/14/18 12:38  06/14/18 13:00





  Zofran 4 Mg/2 Ml Vial**  IV  06/14/18 12:39  4 mg





  STAT ONE   Administration


 


Ondansetron HCl  Confirm  06/14/18 12:56  





  Zofran 4 Mg/2 Ml Vial**  Administered  06/14/18 12:57  





  Dose   





  4 mg   





  .ROUTE   





  .STK-MED ONE   











Lab/Rad Data: 


 Laboratory Result Diagrams





 06/14/18 12:54 





 06/14/18 12:54 





 Laboratory Results











  06/14/18 06/14/18 06/14/18 Range/Units





  12:54 12:54 12:54 


 


WBC     (4.0-10.5)  K/mm3


 


RBC     (4.1-5.6)  M/mm3


 


Hgb     (12.5-18.0)  gm/dl


 


Hct     (42-50)  %


 


MCV     ()  fl


 


MCH     (26-32)  pg


 


MCHC     (32-36)  g/dl


 


RDW     (11.5-14.0)  %


 


Plt Count     (150-450)  K/mm3


 


MPV     (6-9.5)  fl


 


Absolute Granulocytes     (1.4-6.9)  


 


Segmented Neutrophils     (36.-66.)  %


 


Lymphocytes (Manual)     (24-44)  %


 


Monocytes (Manual)     (0.0-12.0)  %


 


Basophils (Manual)     (0.0-1.0)  %


 


Platelet Estimate     (NORMAL)  


 


RBC Morphology     


 


Sodium    138  (137-145)  mmol/L


 


Potassium    4.7  (3.5-5.1)  mmol/L


 


Chloride    98  ()  mmol/L


 


Carbon Dioxide    20 L  (22-30)  mmol/L


 


Anion Gap    24.6 H  (5-15)  MEQ/L


 


BUN    58 H  (9-20)  mg/dL


 


Creatinine    5.61 H  (0.66-1.25)  mg/dL


 


Estimated GFR    12.0  ML/MIN


 


Glucose    135 H  ()  mg/dL


 


Lactic Acid     (0.4-2.0)  


 


Calcium    9.7  (8.4-10.2)  mg/dL


 


Total Bilirubin    0.90  (0.2-1.3)  mg/dL


 


AST    29  (17-59)  U/L


 


ALT    27  (0-50)  U/L


 


Alkaline Phosphatase    117  ()  U/L


 


Creatine Kinase   520 H   ()  U/L


 


Troponin I  < 0.012    (0.000-0.034)  ng/mL


 


Serum Total Protein    7.5  (6.3-8.2)  g/dL


 


Albumin    4.5  (3.5-5.0)  g/dL














  06/14/18 06/14/18 Range/Units





  12:54 12:36 


 


WBC  13.9 H   (4.0-10.5)  K/mm3


 


RBC  4.77   (4.1-5.6)  M/mm3


 


Hgb  13.9   (12.5-18.0)  gm/dl


 


Hct  40.5 L   (42-50)  %


 


MCV  84.9   ()  fl


 


MCH  29.1   (26-32)  pg


 


MCHC  34.3   (32-36)  g/dl


 


RDW  14.1 H   (11.5-14.0)  %


 


Plt Count  251   (150-450)  K/mm3


 


MPV  10.7 H   (6-9.5)  fl


 


Absolute Granulocytes  10.88 H   (1.4-6.9)  


 


Segmented Neutrophils  77 H   (36.-66.)  %


 


Lymphocytes (Manual)  14 L   (24-44)  %


 


Monocytes (Manual)  8   (0.0-12.0)  %


 


Basophils (Manual)  1   (0.0-1.0)  %


 


Platelet Estimate  NORMAL   (NORMAL)  


 


RBC Morphology  NORMAL   


 


Sodium    (137-145)  mmol/L


 


Potassium    (3.5-5.1)  mmol/L


 


Chloride    ()  mmol/L


 


Carbon Dioxide    (22-30)  mmol/L


 


Anion Gap    (5-15)  MEQ/L


 


BUN    (9-20)  mg/dL


 


Creatinine    (0.66-1.25)  mg/dL


 


Estimated GFR    ML/MIN


 


Glucose    ()  mg/dL


 


Lactic Acid   0.9  (0.4-2.0)  


 


Calcium    (8.4-10.2)  mg/dL


 


Total Bilirubin    (0.2-1.3)  mg/dL


 


AST    (17-59)  U/L


 


ALT    (0-50)  U/L


 


Alkaline Phosphatase    ()  U/L


 


Creatine Kinase    ()  U/L


 


Troponin I    (0.000-0.034)  ng/mL


 


Serum Total Protein    (6.3-8.2)  g/dL


 


Albumin    (3.5-5.0)  g/dL














- Progress


Progress: improved


Progress Note: 





06/14/18 14:54


The CT upper extremity shows soft tissue swelling with tiny subcutaneous air 

bubble for which gas forming infection not completely excluded. The patient has 

received 2 bags of NS fluids but his SBP is still in the 90's. The patient has 

received vancomycin and will also get zosyn for possible sepsis and possible 

necrotizing fasciitis. The creatinine is over 5 which is new for this patient. 

Pt will require surgical intervention and we have placed a call to his 

orthopedic surgeon, Dr Reyes.


06/14/18 15:02


As per his office, as of 6/7, patient will no longer be seen in his office due 

to noncompliance.


06/14/18 15:13


Pt has been accepted by ER physician Dr Rendon at [facility].





- Departure


Time of Disposition: 15:14


Departure Disposition: Transfer


Clinical Impression: 


 Sepsis affecting skin





Condition: Fair


Critical Care Time: Yes


Critical Care Time(excluding separately billable procedures):  minutes


Referrals: 


LOWELL BEEBE [Primary Care Provider] -

## 2018-07-27 ENCOUNTER — HOSPITAL ENCOUNTER (EMERGENCY)
Dept: HOSPITAL 33 - ED | Age: 38
Discharge: ANOTHER HOSPITAL | End: 2018-07-27
Payer: COMMERCIAL

## 2018-07-27 VITALS — HEART RATE: 18 BPM | SYSTOLIC BLOOD PRESSURE: 130 MMHG | DIASTOLIC BLOOD PRESSURE: 52 MMHG

## 2018-07-27 VITALS — OXYGEN SATURATION: 98 %

## 2018-07-27 DIAGNOSIS — E87.5: ICD-10-CM

## 2018-07-27 DIAGNOSIS — N39.0: ICD-10-CM

## 2018-07-27 DIAGNOSIS — S22.32XA: ICD-10-CM

## 2018-07-27 DIAGNOSIS — E87.1: ICD-10-CM

## 2018-07-27 DIAGNOSIS — R42: ICD-10-CM

## 2018-07-27 DIAGNOSIS — Z79.899: ICD-10-CM

## 2018-07-27 DIAGNOSIS — R11.2: ICD-10-CM

## 2018-07-27 DIAGNOSIS — N17.9: Primary | ICD-10-CM

## 2018-07-27 DIAGNOSIS — R53.1: ICD-10-CM

## 2018-07-27 LAB
ALBUMIN SERPL-MCNC: 4.3 G/DL (ref 3.5–5)
ALP SERPL-CCNC: 67 U/L (ref 38–126)
ALT SERPL-CCNC: < 4 U/L (ref 0–50)
AMPHETAMINES UR QL: NEGATIVE
ANION GAP SERPL CALC-SCNC: 19.4 MEQ/L (ref 5–15)
AST SERPL QL: 10 U/L (ref 17–59)
BARBITURATES UR QL: NEGATIVE
BENZODIAZ UR QL SCN: POSITIVE
BILIRUB BLD-MCNC: 0.3 MG/DL (ref 0.2–1.3)
BUN SERPL-MCNC: 179 MG/DL (ref 9–20)
CALCIUM SPEC-MCNC: 9.1 MG/DL (ref 8.4–10.2)
CELLS COUNTED: 100
CHLORIDE SERPL-SCNC: 87 MMOL/L (ref 98–107)
CO2 SERPL-SCNC: 25 MMOL/L (ref 22–30)
COCAINE UR QL SCN: NEGATIVE
CREAT SERPL-MCNC: 4.02 MG/DL (ref 0.66–1.25)
GLUCOSE SERPL-MCNC: 118 MG/DL (ref 74–106)
GLUCOSE UR-MCNC: NEGATIVE MG/DL
HCT VFR BLD AUTO: 36.2 % (ref 42–50)
HGB BLD-MCNC: 12.7 GM/DL (ref 12.5–18)
LIPASE SERPL-CCNC: 286 U/L (ref 23–300)
MANUAL DIF COMMENT BLD-IMP: NORMAL
MCH RBC QN AUTO: 29 PG (ref 26–32)
MCHC RBC AUTO-ENTMCNC: 35.1 G/DL (ref 32–36)
METHADONE UR QL: NEGATIVE
NEUTS BAND # BLD MANUAL: 5 % (ref 0–2)
OPIATES UR QL: NEGATIVE
PCP UR QL CFM>20 NG/ML: NEGATIVE
PLATELET # BLD AUTO: 302 K/MM3 (ref 150–450)
POTASSIUM SERPLBLD-SCNC: 5.3 MMOL/L (ref 3.5–5.1)
PROT SERPL-MCNC: 7.2 G/DL (ref 6.3–8.2)
PROT UR STRIP-MCNC: 30 MG/DL
RBC # BLD AUTO: 4.38 M/MM3 (ref 4.1–5.6)
RBC # URNS HPF: (no result) /HPF (ref 0–2)
SODIUM SERPL-SCNC: 125 MMOL/L (ref 137–145)
THC UR QL SCN: NEGATIVE
VARIANT LYMPHS BLD QL SMEAR: 1 %
WBC # BLD AUTO: 11.5 K/MM3 (ref 4–10.5)
WBC URNS QL MICRO: (no result) /HPF (ref 0–5)

## 2018-07-27 PROCEDURE — 80307 DRUG TEST PRSMV CHEM ANLYZR: CPT

## 2018-07-27 PROCEDURE — 87086 URINE CULTURE/COLONY COUNT: CPT

## 2018-07-27 PROCEDURE — 71100 X-RAY EXAM RIBS UNI 2 VIEWS: CPT

## 2018-07-27 PROCEDURE — 99285 EMERGENCY DEPT VISIT HI MDM: CPT

## 2018-07-27 PROCEDURE — 87040 BLOOD CULTURE FOR BACTERIA: CPT

## 2018-07-27 PROCEDURE — 96360 HYDRATION IV INFUSION INIT: CPT

## 2018-07-27 PROCEDURE — 80053 COMPREHEN METABOLIC PANEL: CPT

## 2018-07-27 PROCEDURE — 81000 URINALYSIS NONAUTO W/SCOPE: CPT

## 2018-07-27 PROCEDURE — 96374 THER/PROPH/DIAG INJ IV PUSH: CPT

## 2018-07-27 PROCEDURE — 83605 ASSAY OF LACTIC ACID: CPT

## 2018-07-27 PROCEDURE — 71046 X-RAY EXAM CHEST 2 VIEWS: CPT

## 2018-07-27 PROCEDURE — 83690 ASSAY OF LIPASE: CPT

## 2018-07-27 PROCEDURE — 85025 COMPLETE CBC W/AUTO DIFF WBC: CPT

## 2018-07-27 PROCEDURE — 74018 RADEX ABDOMEN 1 VIEW: CPT

## 2018-07-27 PROCEDURE — 36415 COLL VENOUS BLD VENIPUNCTURE: CPT

## 2018-07-27 NOTE — ERPHSYRPT
- History of Present Illness


Time Seen by Provider: 07/27/18 12:18


Source: patient, family (mother)


Exam Limitations: no limitations


Patient Subjective Stated Complaint: pt alert, walked in , is a poor historian, 

resp easy,  skin w/d/p. has picc line to left arm, and has healed incison to 

right arm


Triage Nursing Assessment: pt here for a seizures off and on for 2 weeks with 

falls, and not feeling well,pt has picc line for antibotics for and infected 

right arm,pt also states vomiting daily.


Physician History: 





The patient is a 38-year-old male with his mother complaining of being dizzy, 

falling, possible seizures, and vomiting for about 2 weeks.  He is a very poor 

historian.  His mother provides some of the history.  She does not live near 

him area and she lives in [place].  The patient had surgery to his right 

forearm in February.  The surgical site became infected.  Since that time he 

tells me he's had 7 surgeries and still is planning to have maybe one more.  

For the past 5 weeks he has been on antibiotics through a PICC line 3 times a 

day of Cefzil and 2 g.  He does the antibiotics at home.  His brother helps 

him.  He usually receives his weekly antibiotics on Thursday.  He did not 

receive any yesterday, Thursday, and thinks he may be done with the antibiotics 

but does not know.  He started vomiting about one week ago.  He states he hasn'

t had anything to eat or drink for 2 or 3 days because of the vomiting.  When 

he stands up, he several times gets dizzy.  This sometimes happens daily for 

the past week.  He has fallen a few times on his left side and back.  He denies 

shortness of breath or chest pain.  His past medical history significant for 

hypertension, seizures, hernia repair, and right forearm surgeries.


Timing/Duration: week(s) (2)


Severity: moderate


Modifying Factors: Improves With: nothing


Associated Symptoms: nausea, vomiting


Allergies/Adverse Reactions: 








No Known Drug Allergies Allergy (Verified 07/27/18 11:52)


 





Home Medications: 








Amlodipine Besylate 10 mg [Norvasc 10 MG] 10 mg PO DAILY 02/19/16 [History]


Lamotrigine [Lamictal] 100 mg PO BID 02/19/16 [History]


Lisinopril 40 mg PO DAILY 02/19/16 [History]


Fluticasone Propionate*** [Flonase NASAL***] 16 gm NS DAILY 04/18/18 [History]


Paroxetine HCl 1 tablet PO DAILY 04/18/18 [History]


Testosterone Cypionate 200 mg IM UD 04/18/18 [History]


Alprazolam 1 mg*** [Xanax 1 mg***] 1 mg BID 07/27/18 [History]





Hx Tetanus, Diphtheria Vaccination/Date Given: Yes


Hx Influenza Vaccination/Date Given: No


Hx Pneumococcal Vaccination/Date Given: No


Immunizations Up to Date: Yes





- Review of Systems


Constitutional: Lethargy, Weakness


Eyes: No Symptoms


Ears, Nose, & Throat: No Symptoms


Respiratory: No Cough, No Dyspnea


Abdominal/Gastrointestinal: Nausea, Vomiting, No Diarrhea


Genitourinary Symptoms: No Dysuria


Musculoskeletal: Fall, Injury


Skin: No Rash


Neurological: Seizure, No Dizziness, No Focal Weakness, No Sensory Changes


Psychological: No Symptoms


Endocrine: No Symptoms


Hematologic/Lymphatic: No Symptoms


Immunological/Allergic: No Symptoms


All Other Systems: Reviewed and Negative





- Past Medical History


Pertinent Past Medical History: Yes


Neurological History: Seizures


ENT History: No Pertinent History


Cardiac History: No Pertinent History, Hypertension


Respiratory History: No Pertinent History


Endocrine Medical History: No Pertinent History


Musculoskeletal History: Degenerative Disk Disease, Fractures


GI Medical History: Hernia


 History: No Pertinent History


Psycho-Social History: Anxiety


Male Reproductive Disorders: No Pertinent History


Other Medical History: CHRONIC BACK PAIN, infection to right arm in 2018





- Past Surgical History


Past Surgical History: Yes


Neuro Surgical History: No Pertinent History


Cardiac: No Pertinent History


Respiratory: No Pertinent History


Gastrointestinal: Hernia Repair


Genitourinary: No Pertinent History


Musculoskeletal: No Pertinent History


Male Surgical History: No Pertinent History


Other Surgical History: surgical repain of fx to right arm.  hardware removal 

right arm.  and I&D with antibiotic bead placement





- Social History


Smoking Status: Former smoker


How long have you smoked: 2 years


Exposure to second hand smoke: No


Alcohol Use: Socially


Drug Use: none


Patient Lives Alone: No


Significant Family History: no pertinent family hx





- Nursing Vital Signs


Nursing Vital Signs: 


 Initial Vital Signs











Pulse Rate  89   07/27/18 11:40


 


Respiratory Rate  16   07/27/18 11:40


 


Blood Pressure  111/62   07/27/18 11:40


 


O2 Sat by Pulse Oximetry  98   07/27/18 11:40








 Pain Scale











Pain Intensity                 8

















- Physical Exam


General Appearance: lethargy


Eye Exam: PERRL/EOMI, eyes nml inspection


Ears, Nose, Throat Exam: TMs normal, pharynx normal, moist mucous membranes, 

dry mucous membranes


Neck Exam: normal inspection, non-tender, supple, full range of motion


Respiratory Exam: normal breath sounds, lungs clear, No respiratory distress


Cardiovascular Exam: regular rate/rhythm, normal heart sounds, normal 

peripheral pulses


Gastrointestinal/Abdomen Exam: soft, normal bowel sounds, No tenderness, No mass


Rectal Exam: not done


Back Exam: normal inspection, normal range of motion, No CVA tenderness, No 

vertebral tenderness


Extremity Exam: normal inspection, normal range of motion, pelvis stable


Neurologic Exam: alert, oriented x 3, cooperative, normal mood/affect, nml 

cerebellar function, nml station & gait, sensation nml, No motor deficits


Skin Exam: normal color, warm, dry, No rash


Lymphatic Exam: No adenopathy


**SpO2 Interpretation**: normal


SpO2: 98


Oxygen Delivery: Room Air





- Radiology Exams


  ** Chest


X-ray Interpretation: Reviewed by me, Teleradiologist Report (per Dr Lopez), Non-

displaced Fracture (left 10th rib)





  ** Left Ribs


X-ray Interpretation: Reviewed by me, Teleradiologist Report (per Dr Lopez), Non-

displaced Fracture (left 10th rib)





  ** Abdomen


X-ray Interpretation: Reviewed by me, Teleradiologist Report (per Dr Lopez), Non-

displaced Fracture (left 10th rib)


Ordered Tests: 


 Active Orders 24 hr











 Category Date Time Status


 


 Clean Catch Urine Specimen STAT Care  07/27/18 12:24 Active


 


 IV Insertion STAT Care  07/27/18 12:24 Active


 


 CHEST 2 VIEWS (PA AND LAT) Stat Exams  07/27/18 12:26 Completed


 


 KUB Stat Exams  07/27/18 12:25 Completed


 


 RIBS UNILATERAL Stat Exams  07/27/18 12:26 Completed


 


 BLOOD CULTURE Stat Lab  07/27/18 12:55 Received


 


 CBC W DIFF Stat Lab  07/27/18 12:55 Completed


 


 CMP Stat Lab  07/27/18 12:55 Completed


 


 CULTURE,URINE Stat Lab  07/27/18 14:57 Received


 


 LIPASE Stat Lab  07/27/18 12:55 Completed


 


 Lactic Acid Stat Lab  07/27/18 12:40 Completed


 


 Manual Differential NC Stat Lab  07/27/18 12:55 Completed


 


 UA W/ MICROSCOPIC Stat Lab  07/27/18 14:57 Completed


 


 Urine Triage Profile Stat Lab  07/27/18 14:57 Completed








Medication Summary














Discontinued Medications














Generic Name Dose Route Start Last Admin





  Trade Name Tabitha  PRN Reason Stop Dose Admin


 


Sodium Chloride  1,000 mls @ 999 mls/hr  07/27/18 12:24  07/27/18 14:32





  Sodium Chloride 0.9% 1000 Ml  IV  07/27/18 13:24  Infused





  .Q1H1M STA   Infusion





     





     





     





     


 


Sodium Chloride  Confirm  07/27/18 12:33  





  Sodium Chloride 0.9% 1000 Ml  Administered  07/27/18 12:34  





  Dose   





  1,000 mls @ ud   





  .ROUTE   





  .STK-MED ONE   





     





     





     





     


 


Ondansetron HCl  4 mg  07/27/18 12:24  07/27/18 12:42





  Zofran 4 Mg/2 Ml Vial**  IV  07/27/18 12:25  4 mg





  STAT ONE   Administration





     





     





     





     


 


Ondansetron HCl  Confirm  07/27/18 12:33  





  Zofran 4 Mg/2 Ml Vial**  Administered  07/27/18 12:34  





  Dose   





  4 mg   





  .ROUTE   





  .STK-MED ONE   





     





     





     





     











Lab/Rad Data: 


 Laboratory Result Diagrams





 07/27/18 12:55 





 07/27/18 12:55 





 Laboratory Results











  07/27/18 07/27/18 07/27/18 Range/Units





  14:57 14:57 12:55 


 


WBC     (4.0-10.5)  K/mm3


 


RBC     (4.1-5.6)  M/mm3


 


Hgb     (12.5-18.0)  gm/dl


 


Hct     (42-50)  %


 


MCV     ()  fl


 


MCH     (26-32)  pg


 


MCHC     (32-36)  g/dl


 


RDW     (11.5-14.0)  %


 


Plt Count     (150-450)  K/mm3


 


MPV     (6-9.5)  fl


 


Segmented Neutrophils     (36.-66.)  %


 


Band Neutrophils     (0.0-2.0)  %


 


Lymphocytes (Manual)     (24-44)  %


 


Monocytes (Manual)     (0.0-12.0)  %


 


Eosinophils (Manual)     (0.00-3.0)  %


 


Atypical Lymphocytes     %


 


Platelet Estimate     (NORMAL)  


 


RBC Morphology     


 


Sodium    125 L  (137-145)  mmol/L


 


Potassium    5.3 H  (3.5-5.1)  mmol/L


 


Chloride    87 L  ()  mmol/L


 


Carbon Dioxide    25  (22-30)  mmol/L


 


Anion Gap    19.4 H  (5-15)  MEQ/L


 


BUN    179 H  (9-20)  mg/dL


 


Creatinine    4.02 H  (0.66-1.25)  mg/dL


 


Estimated GFR    17.7  ML/MIN


 


Glucose    118 H  ()  mg/dL


 


Lactic Acid     (0.4-2.0)  


 


Calcium    9.1  (8.4-10.2)  mg/dL


 


Total Bilirubin    0.30  (0.2-1.3)  mg/dL


 


AST    10 L  (17-59)  U/L


 


ALT    < 4  (0-50)  U/L


 


Alkaline Phosphatase    67  ()  U/L


 


Serum Total Protein    7.2  (6.3-8.2)  g/dL


 


Albumin    4.3  (3.5-5.0)  g/dL


 


Lipase    286  ()  U/L


 


Ur Collection Type   CLEAN CATCH   


 


Urine Color   YELLOW   (YELLOW)  


 


Urine Appearance   CLEAR   (CLEAR)  


 


Urine pH   5.0   (5-6)  


 


Ur Specific Gravity   1.015   (1.005-1.025)  


 


Urine Protein   30   (Negative)  


 


Urine Ketones   NEGATIVE   (NEGATIVE)  


 


Urine Blood   NEGATIVE   (0-5)  Ketan/ul


 


Urine Nitrite   NEGATIVE   (NEGATIVE)  


 


Urine Bilirubin   NEGATIVE   (NEGATIVE)  


 


Urine Urobilinogen   NORMAL   (0-1)  mg/dL


 


Ur Leukocyte Esterase   NEGATIVE   (NEGATIVE)  


 


Urine Microscopic RBC   0-2   (0-2)  /HPF


 


Urine Microscopic WBC   0-2   (0-5)  /HPF


 


Urine Bacteria   MANY   (NEGATIVE)  /HPF


 


Urine Culture Reflexed   YES   (NO)  


 


Urine Glucose   NEGATIVE   (NEGATIVE)  mg/dL


 


Urine Opiates Level  NEGATIVE    (NEGATIVE)  


 


Ur Methadone  NEGATIVE    (NEGATIVE)  


 


Urine Barbiturates  NEGATIVE    (NEGATIVE)  


 


Ur Phencyclidine (PCP)  NEGATIVE    (NEGATIVE)  


 


Urine Amphetamine  NEGATIVE    (NEGATIVE)  


 


U Benzodiazepine Level  POSITIVE    (NEGATIVE)  


 


Urine Cocaine  NEGATIVE    (NEGATIVE)  


 


Urine Marijuana (THC)  NEGATIVE    (NEGATIVE)  


 


Specimen Received   07/27/18 1445   














  07/27/18 07/27/18 Range/Units





  12:55 12:40 


 


WBC  11.5 H   (4.0-10.5)  K/mm3


 


RBC  4.38   (4.1-5.6)  M/mm3


 


Hgb  12.7   (12.5-18.0)  gm/dl


 


Hct  36.2 L   (42-50)  %


 


MCV  82.6   ()  fl


 


MCH  29.0   (26-32)  pg


 


MCHC  35.1   (32-36)  g/dl


 


RDW  13.9   (11.5-14.0)  %


 


Plt Count  302   (150-450)  K/mm3


 


MPV  11.1 H   (6-9.5)  fl


 


Segmented Neutrophils  70 H   (36.-66.)  %


 


Band Neutrophils  5 H   (0.0-2.0)  %


 


Lymphocytes (Manual)  13 L   (24-44)  %


 


Monocytes (Manual)  9   (0.0-12.0)  %


 


Eosinophils (Manual)  2   (0.00-3.0)  %


 


Atypical Lymphocytes  1   %


 


Platelet Estimate  NORMAL   (NORMAL)  


 


RBC Morphology  NORMAL   


 


Sodium    (137-145)  mmol/L


 


Potassium    (3.5-5.1)  mmol/L


 


Chloride    ()  mmol/L


 


Carbon Dioxide    (22-30)  mmol/L


 


Anion Gap    (5-15)  MEQ/L


 


BUN    (9-20)  mg/dL


 


Creatinine    (0.66-1.25)  mg/dL


 


Estimated GFR    ML/MIN


 


Glucose    ()  mg/dL


 


Lactic Acid   0.6  (0.4-2.0)  


 


Calcium    (8.4-10.2)  mg/dL


 


Total Bilirubin    (0.2-1.3)  mg/dL


 


AST    (17-59)  U/L


 


ALT    (0-50)  U/L


 


Alkaline Phosphatase    ()  U/L


 


Serum Total Protein    (6.3-8.2)  g/dL


 


Albumin    (3.5-5.0)  g/dL


 


Lipase    ()  U/L


 


Ur Collection Type    


 


Urine Color    (YELLOW)  


 


Urine Appearance    (CLEAR)  


 


Urine pH    (5-6)  


 


Ur Specific Gravity    (1.005-1.025)  


 


Urine Protein    (Negative)  


 


Urine Ketones    (NEGATIVE)  


 


Urine Blood    (0-5)  Ketan/ul


 


Urine Nitrite    (NEGATIVE)  


 


Urine Bilirubin    (NEGATIVE)  


 


Urine Urobilinogen    (0-1)  mg/dL


 


Ur Leukocyte Esterase    (NEGATIVE)  


 


Urine Microscopic RBC    (0-2)  /HPF


 


Urine Microscopic WBC    (0-5)  /HPF


 


Urine Bacteria    (NEGATIVE)  /HPF


 


Urine Culture Reflexed    (NO)  


 


Urine Glucose    (NEGATIVE)  mg/dL


 


Urine Opiates Level    (NEGATIVE)  


 


Ur Methadone    (NEGATIVE)  


 


Urine Barbiturates    (NEGATIVE)  


 


Ur Phencyclidine (PCP)    (NEGATIVE)  


 


Urine Amphetamine    (NEGATIVE)  


 


U Benzodiazepine Level    (NEGATIVE)  


 


Urine Cocaine    (NEGATIVE)  


 


Urine Marijuana (THC)    (NEGATIVE)  


 


Specimen Received    














- Progress


Progress: improved


Discussed with .: Justin, Other (Dr Rendon at [facility])


Counseled pt/family regarding: lab results, diagnosis, rad results





- Departure


Time of Disposition: 15:35


Departure Disposition: Transfer (Transfer to [facility] ER per Dr Rendon.)


Clinical Impression: 


 Acute renal failure, Hyponatremia, UTI (urinary tract infection), Hyperkalemia

, Left rib fracture





Condition: Stable


Critical Care Time: No


Referrals: 


LOWELL BEEBE [Primary Care Provider] -

## 2018-07-27 NOTE — XRAY
Indication: Pain following fall.



Comparison: None



KUB nonobstructed with minimally displaced lateral 10th rib acute fracture.

Remaining solid organs and osseous structures unremarkable.  Chest reported

separately.

## 2018-07-27 NOTE — XRAY
Indication: Pain following fall.



Comparison: June 14, 2018



PA/lateral chest demonstrates new lateral 10th rib acute fracture and new left

arm PICC line.  Stable remote minimal T5 compression deformity.  Remaining

heart, lungs, and bony thorax normal.

## 2018-07-27 NOTE — XRAY
Indication: Pain following fall.



Comparison: None



2 views of the left ribs demonstrates minimally displaced lateral 10th rib

acute fracture, mild AC degenerative arthropathy, and left arm PICC line.  No

other bony, articular, or soft tissue abnormalities.